# Patient Record
(demographics unavailable — no encounter records)

---

## 2025-01-22 NOTE — HISTORY OF PRESENT ILLNESS
[FreeTextEntry1] : No new complains.  [de-identified] : 32 yrs old F with pmx of depressed mood comes in to establish care and for annual exam. No new complains.  Denies any chest pain, sob, palpitations, cough, fevers, abd pain, vomiting, diarrhea, rash, joint pains.   sleep- good appetite- good mood- follows with a therapist once a week, symptoms under control these days, not on any meds, no SI/HI.  menstrual cycle- regular, normal flow, on her periods these days PAP- 03/2024- was normal HPV- 2 doses flu- don't want it covid- 4 doses tdap- within 10 yrs STD- no interested skin- no lesion